# Patient Record
(demographics unavailable — no encounter records)

---

## 2025-03-10 NOTE — PLAN
[FreeTextEntry1] : HTN- monitor BP at home, continue Amlodipine 10mg daily, Losartan/HCTZ 100/25mg daily HLD continue simvastatin to 20mg daily GERD- continue dietary modification, Pepcid prn

## 2025-03-10 NOTE — PHYSICAL EXAM
[Normal] : affect was normal and insight and judgment were intact [No Acute Distress] : no acute distress [Well Nourished] : well nourished [Well Developed] : well developed [Well-Appearing] : well-appearing [Normal Sclera/Conjunctiva] : normal sclera/conjunctiva [PERRL] : pupils equal round and reactive to light [EOMI] : extraocular movements intact [Normal Outer Ear/Nose] : the outer ears and nose were normal in appearance [Normal Oropharynx] : the oropharynx was normal [No JVD] : no jugular venous distention [No Lymphadenopathy] : no lymphadenopathy [Supple] : supple [Thyroid Normal, No Nodules] : the thyroid was normal and there were no nodules present [No Respiratory Distress] : no respiratory distress  [No Accessory Muscle Use] : no accessory muscle use [Clear to Auscultation] : lungs were clear to auscultation bilaterally [Normal Rate] : normal rate  [Regular Rhythm] : with a regular rhythm [Normal S1, S2] : normal S1 and S2 [No Murmur] : no murmur heard [No Varicosities] : no varicosities [Pedal Pulses Present] : the pedal pulses are present [No Edema] : there was no peripheral edema [No Palpable Aorta] : no palpable aorta [No Extremity Clubbing/Cyanosis] : no extremity clubbing/cyanosis [Soft] : abdomen soft [Non Tender] : non-tender [Non-distended] : non-distended [No Masses] : no abdominal mass palpated [No HSM] : no HSM [Normal Bowel Sounds] : normal bowel sounds [Normal Posterior Cervical Nodes] : no posterior cervical lymphadenopathy [Normal Anterior Cervical Nodes] : no anterior cervical lymphadenopathy [No CVA Tenderness] : no CVA  tenderness [No Spinal Tenderness] : no spinal tenderness [No Joint Swelling] : no joint swelling [Grossly Normal Strength/Tone] : grossly normal strength/tone [No Rash] : no rash [Coordination Grossly Intact] : coordination grossly intact [No Focal Deficits] : no focal deficits [Normal Gait] : normal gait [Normal Affect] : the affect was normal [Normal Insight/Judgement] : insight and judgment were intact

## 2025-03-10 NOTE — HEALTH RISK ASSESSMENT
[Good] : ~his/her~  mood as  good [No] : No [0] : 2) Feeling down, depressed, or hopeless: Not at all (0) [PHQ-2 Negative - No further assessment needed] : PHQ-2 Negative - No further assessment needed [Former] : Former [5-9] : 5-9 [> 15 Years] : > 15 Years [Patient reported mammogram was normal] : Patient reported mammogram was normal [Patient reported PAP Smear was normal] : Patient reported PAP Smear was normal [Patient reported colonoscopy was normal] : Patient reported colonoscopy was normal [HIV test declined] : HIV test declined [Hepatitis C test declined] : Hepatitis C test declined [None] : None [With Family] : lives with family [College] : College [] :  [# Of Children ___] : has [unfilled] children [Sexually Active] : sexually active [Feels Safe at Home] : Feels safe at home [Smoke Detector] : smoke detector [Carbon Monoxide Detector] : carbon monoxide detector [Safety elements used in home] : safety elements used in home [Seat Belt] :  uses seat belt [Sunscreen] : uses sunscreen [[id number]] : 0 [Change in mental status noted] : No change in mental status noted [High Risk Behavior] : no high risk behavior [Reports changes in hearing] : Reports no changes in hearing [Reports changes in vision] : Reports no changes in vision [Reports changes in dental health] : Reports no changes in dental health [Travel to Developing Areas] : does not  travel to developing areas [TB Exposure] : is not being exposed to tuberculosis [Caregiver Concerns] : does not have caregiver concerns [MammogramComments] : 4 months ago [PapSmearComments] : 5 yr ago [ColonoscopyComments] : 1 yr

## 2025-03-10 NOTE — HISTORY OF PRESENT ILLNESS
[de-identified] : 67 year old female with history of GERD, HTN, HLD presents for annual exam.  She reports significant improvement of heartburn and only noticed symptoms when drinking occ wine.   She has not taken her BP med this morning but states she sometimes runs high.

## 2025-03-10 NOTE — HISTORY OF PRESENT ILLNESS
[de-identified] : 67 year old female with history of GERD, HTN, HLD presents for annual exam.  She reports significant improvement of heartburn and only noticed symptoms when drinking occ wine.   She has not taken her BP med this morning but states she sometimes runs high.

## 2025-03-10 NOTE — HEALTH RISK ASSESSMENT
[Good] : ~his/her~  mood as  good [No] : No [0] : 2) Feeling down, depressed, or hopeless: Not at all (0) [PHQ-2 Negative - No further assessment needed] : PHQ-2 Negative - No further assessment needed [Former] : Former [5-9] : 5-9 [> 15 Years] : > 15 Years [Patient reported mammogram was normal] : Patient reported mammogram was normal [Patient reported PAP Smear was normal] : Patient reported PAP Smear was normal [Patient reported colonoscopy was normal] : Patient reported colonoscopy was normal [HIV test declined] : HIV test declined [Hepatitis C test declined] : Hepatitis C test declined [None] : None [With Family] : lives with family [College] : College [] :  [# Of Children ___] : has [unfilled] children [Sexually Active] : sexually active [Feels Safe at Home] : Feels safe at home [Smoke Detector] : smoke detector [Carbon Monoxide Detector] : carbon monoxide detector [Safety elements used in home] : safety elements used in home [Seat Belt] :  uses seat belt [Sunscreen] : uses sunscreen [[id number]] : 0 [Change in mental status noted] : No change in mental status noted [High Risk Behavior] : no high risk behavior [Reports changes in hearing] : Reports no changes in hearing [Reports changes in vision] : Reports no changes in vision [Reports changes in dental health] : Reports no changes in dental health [Travel to Developing Areas] : does not  travel to developing areas [TB Exposure] : is not being exposed to tuberculosis [Caregiver Concerns] : does not have caregiver concerns [MammogramComments] : 4 months ago [PapSmearComments] : 5 yr ago [ColonoscopyComments] : 1 yr

## 2025-03-17 NOTE — HISTORY OF PRESENT ILLNESS
[FreeTextEntry1] : 68-year-old woman, with a past medical history of hypertension, hyperlipidemia, gastroesophageal reflux disease, presenting for follow up. Patient presently denies chest pain, shortness of breath/dyspnea on exertion, palpitations, dizziness/loss of consciousness, paroxysmal nocturnal dyspnea, orthopnea, headaches, abdominal pain, and lower extremity swelling. She is very active, noting that she takes care of her grandchildren, and exerts herself without inhibition by chest pain or dyspnea.

## 2025-03-17 NOTE — CARDIOLOGY SUMMARY
[de-identified] : 3/17/25: NSR at 82 bpm, NSSTTC 1/29/24: NSR at 84 bpm, NSSTTC [de-identified] : TTE 5/7/2019: LVEF 60-65%, normal LV wall thickness and diastolic function, trace MR, trace AI

## 2025-03-17 NOTE — ASSESSMENT
[FreeTextEntry1] : 68-year-old woman, with a past medical history of hypertension, hyperlipidemia, gastroesophageal reflux disease, presenting for follow up.  HTN: Blood pressure consistently elevated on initial measurement clinical visits, but consistently decreases to normal levels by the second measurement.  Therefore, I believe there is whitecoat hypertension superimposed on essential hypertension. - continue amlodipine 10 mg daily - continue losartan-HCTZ 100-25 mg daily  - patient to keep home BP log and will report this in 1 week, with changes to regimen PRN  HLD: Latest lipid profile on March 10, 2025 with a slightly elevated low-density protein (101); consistent with prior on December 7, 2023 with , , HDL 49, and TG 84. - continue simvastatin 10 mg QHS - deferring further medical therapy at this time in favor of lifestyle interventions (diet and exercise)  - increase aerobic exercise as tolerated to aim for approximately 30 minutes of activity 5 days a week - heart healthy diet low in sodium, sugars and saturated fats and high in fruits, vegetables and whole grains - will reassess lipid panel in 3 months after consistent lifestyle modification, and readdress potential increase in statin if there is no meaningful change in the lipid panel at that time  F/u s/p TTE; will call patient with results.